# Patient Record
Sex: MALE | Race: WHITE | Employment: UNEMPLOYED | URBAN - METROPOLITAN AREA
[De-identification: names, ages, dates, MRNs, and addresses within clinical notes are randomized per-mention and may not be internally consistent; named-entity substitution may affect disease eponyms.]

---

## 2018-11-01 ENCOUNTER — HOSPITAL ENCOUNTER (EMERGENCY)
Age: 33
Discharge: HOME | End: 2018-11-01
Attending: EMERGENCY MEDICINE
Payer: MEDICAID

## 2018-11-01 VITALS
RESPIRATION RATE: 16 BRPM | BODY MASS INDEX: 22.35 KG/M2 | TEMPERATURE: 97.8 F | OXYGEN SATURATION: 99 % | SYSTOLIC BLOOD PRESSURE: 99 MMHG | DIASTOLIC BLOOD PRESSURE: 60 MMHG | HEART RATE: 98 BPM | HEIGHT: 72 IN | WEIGHT: 165 LBS

## 2018-11-01 DIAGNOSIS — T50.905A ADVERSE EFFECT OF DRUG, INITIAL ENCOUNTER: Primary | ICD-10-CM

## 2018-11-01 PROCEDURE — 99284 EMERGENCY DEPT VISIT MOD MDM: CPT

## 2018-11-01 PROCEDURE — 6360000002 HC RX W HCPCS: Performed by: EMERGENCY MEDICINE

## 2018-11-01 RX ORDER — ONDANSETRON 4 MG/1
4 TABLET, ORALLY DISINTEGRATING ORAL ONCE
Status: COMPLETED | OUTPATIENT
Start: 2018-11-01 | End: 2018-11-01

## 2018-11-01 RX ADMIN — ONDANSETRON 4 MG: 4 TABLET, ORALLY DISINTEGRATING ORAL at 00:32

## 2018-11-01 NOTE — ED PROVIDER NOTES
Patient Name:  Pari Nj  :  1985  MRN:  [id number]  Date of Visit:  2018    Triage Chief Complaint:   Drug Overdose (accidental. pt states he takes valium and percocet for migraines. pt states he was out of his valium so he took a xanax from his neighbor, unsure if it was really xanax. no medications given in route. )    HPI:  Pari Nj is a 29 y.o. male who presents by EMS for reported potential drug overdose. Pt reports he was at home with his girlfriend this evening and took what he thought was a Xanax from his neighbor due to migraine headache. He is not sure what happened afterwards, states that he felt tired and thought he fell asleep. He awoke to paramedic providers at his home. He believes his girlfriend called EMS. I was not able to obtain history from EMS providers prior to their departure from the ED. Patient reports that he has a history of chronic migraines and usually takes Valium and Percocet for this at home. He reports that he was out of his Valium so he took what he thought was the Xanax from his neighbor. Nursing staff reported to me that paramedics did not give any medications including Narcan prior to arrival. Patient was awake and alert during transport to the emergency department. He currently denies any complaints other than mild nausea. He reports his headache is no longer present at this time. He denies feeling fatigued, confused and denies any pain anywhere. He denies any suicidal intent or intent to overdose. He denies any other associated history or symptoms. He was ambulatory from the ambulance into the emergency department. ROS:  Review of Systems  Ten point ROS was performed and is negative except as stated in HPI above.      Past Medical History:   Diagnosis Date    Asthma     GERD (gastroesophageal reflux disease)      Past Surgical History:   Procedure Laterality Date   Yadi De León, KORY  [id number]    robotic single port    ENDOSCOPY, COLON, DIAGNOSTIC  11/22/13    gastritis,hiatal hernia mild esophagitis    HERNIA REPAIR  2005    right angular     Family History   Problem Relation Age of Onset    Other Mother         ovaries removed    Substance Abuse Father         alcoholism     Social History     Social History    Marital status: Single     Spouse name: N/A    Number of children: N/A    Years of education: N/A     Occupational History    Not on file. Social History Main Topics    Smoking status: Current Some Day Smoker     Types: Cigars    Smokeless tobacco: Never Used      Comment: Cigars once a month.  Alcohol use Yes      Comment: occ    Drug use: No    Sexual activity: Not on file     Other Topics Concern    Not on file     Social History Narrative    No narrative on file     Home meds:  Valium  Percocet    Allergies   Allergen Reactions    Other      Bees, cats, horses anything with fur except dogs.  Morphine Nausea And Vomiting       Physical Exam:  ED TRIAGE VITALS  BP: 99/60, Temp: 97.8 °F (36.6 °C), Pulse: 98, Resp: 16, SpO2: 99 %    GENERAL: Appears stated age, awake and alert, no acute distress, non-toxic appearing. A&O x 4. Pleasant and cooperative. HEENT: NC / AT. Oropharynx unremarkable. MMM. OSMIN and normal size. EOMI. NECK: Trachea midline. No overt adenopathy or masses. No JVD. Normal ROM testing without pain. No TTP. CARDIOVASCULAR: RRR. Normal s1/s2. No murmur. Peripheral pulses and perfusion intact. RESPIRATORY: Lungs clear to auscultation bilaterally. No respiratory distress or accessory muscle usage. ABDOMEN: Soft, no tenderness to palpation. Non-distended, no guarding / rebound. SKIN: Warm. Dry. Intact. No obvious skin abnormalities noted. MUSCULOSKELETAL: No swelling or deformities noted. No visible overt ROM restriction noted. [place] NEURO: The patient is awake, alert, interactive. Follows commands and answers questions appropriately. Speech is fluent with intact cognition.  Normal

## 2018-11-01 NOTE — ED NOTES
Pt tolerating PO ice chips without coughing or gagging. Dr. Parrish shin at bedside.       Susana Wang RN  11/01/18 1475